# Patient Record
Sex: MALE | Race: WHITE
[De-identification: names, ages, dates, MRNs, and addresses within clinical notes are randomized per-mention and may not be internally consistent; named-entity substitution may affect disease eponyms.]

---

## 2017-01-03 ENCOUNTER — APPOINTMENT (OUTPATIENT)
Dept: ORTHOPEDIC SURGERY | Facility: CLINIC | Age: 67
End: 2017-01-03

## 2017-01-03 VITALS — BODY MASS INDEX: 26.31 KG/M2 | WEIGHT: 205 LBS | HEIGHT: 74 IN

## 2017-01-03 DIAGNOSIS — Z87.891 PERSONAL HISTORY OF NICOTINE DEPENDENCE: ICD-10-CM

## 2017-02-06 ENCOUNTER — APPOINTMENT (OUTPATIENT)
Dept: ORTHOPEDIC SURGERY | Facility: CLINIC | Age: 67
End: 2017-02-06

## 2018-06-12 ENCOUNTER — RX RENEWAL (OUTPATIENT)
Age: 68
End: 2018-06-12

## 2018-12-18 ENCOUNTER — APPOINTMENT (OUTPATIENT)
Dept: CARDIOLOGY | Facility: CLINIC | Age: 68
End: 2018-12-18
Payer: MEDICARE

## 2018-12-18 PROCEDURE — 93306 TTE W/DOPPLER COMPLETE: CPT

## 2018-12-19 ENCOUNTER — RX RENEWAL (OUTPATIENT)
Age: 68
End: 2018-12-19

## 2019-01-08 ENCOUNTER — APPOINTMENT (OUTPATIENT)
Dept: CARDIOLOGY | Facility: CLINIC | Age: 69
End: 2019-01-08
Payer: MEDICARE

## 2019-01-08 ENCOUNTER — RX RENEWAL (OUTPATIENT)
Age: 69
End: 2019-01-08

## 2019-01-08 VITALS
HEART RATE: 57 BPM | DIASTOLIC BLOOD PRESSURE: 88 MMHG | WEIGHT: 202 LBS | OXYGEN SATURATION: 97 % | SYSTOLIC BLOOD PRESSURE: 142 MMHG | BODY MASS INDEX: 25.94 KG/M2

## 2019-01-08 DIAGNOSIS — Z82.49 FAMILY HISTORY OF ISCHEMIC HEART DISEASE AND OTHER DISEASES OF THE CIRCULATORY SYSTEM: ICD-10-CM

## 2019-01-08 DIAGNOSIS — Z80.9 FAMILY HISTORY OF MALIGNANT NEOPLASM, UNSPECIFIED: ICD-10-CM

## 2019-01-08 PROCEDURE — 93000 ELECTROCARDIOGRAM COMPLETE: CPT

## 2019-01-08 PROCEDURE — 99215 OFFICE O/P EST HI 40 MIN: CPT

## 2019-01-08 RX ORDER — ASPIRIN 325 MG/1
TABLET, FILM COATED ORAL
Refills: 0 | Status: ACTIVE | COMMUNITY

## 2019-01-13 ENCOUNTER — NON-APPOINTMENT (OUTPATIENT)
Age: 69
End: 2019-01-13

## 2019-01-13 NOTE — REVIEW OF SYSTEMS
[Feeling Fatigued] : feeling fatigued [Eyeglasses] : currently wearing eyeglasses [see HPI] : see HPI [Palpitations] : palpitations [Joint Pain] : joint pain [Negative] : Heme/Lymph

## 2019-01-14 NOTE — HISTORY OF PRESENT ILLNESS
[FreeTextEntry1] : 68-year-old man\par Routine followup\par Edmund reports experiencing fleeting palpitations lasting for only seconds. The symptoms are not progressive or severe. No sustained palpitations. No angina. No shortness of breath. No orthopnea. No syncope.

## 2019-01-14 NOTE — DISCUSSION/SUMMARY
[FreeTextEntry1] : Atrial fibrillation\par Mr. Weber has known paroxysmal atrial fibrillation/supraventricular tachycardia since age 40. Atrial arrhythmias including atrial fibrillation with rapid ventricular rates have persisted despite prior ablation procedures. In 9/02 he underwent radiofrequency ablation at [facility]. Previous trials of antiarrhythmic agents including sotalol and flecainide were either not tolerated or ineffective. In 9/13 atrial fibrillationwith ventricular rate 140/minute lead to increasing doses of carvedilol.  A 10/13 Holter monitor revealed frequent atrial ectopy and nonsustained supraventricular tachycardia. A relatively low "[id number]" score is thought to indicate the risk of anticoagulation outweighs any potential benefit at this time.\par \par I have recommended the following:\par 1. Continue the present medical regimen\par 2. No further cardiac testing for this problem at this time\par 3. Increase carvedilol dose (with monitoring for bradycardia) if increasing symptoms of paroxysmal atrial fibrillation occur\par 4. Event recorder or implantable cardiac loop recorder if increasing symptoms without electrocardiographic documentation. 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 Left ventricular systolic dysfunction was reflected by a left ventricular ejection fraction of 45% on 1/13 echocardiogram with left ventricle dilatation manifested by end-diastolic dimension of 6.0 cm. A 2/13 right and left heart catheterization revealed mild diffuse biventricular hypokinesis and a left ventricular ejection fraction of 50%. The left ventricle end-diastolic and pulmonary artery systolic pressures were normal. The coronary arteries were normal. There is been a favorable response to medical intervention. The 12/18 echocardiogram demonstrated a normal left ventricular end-diastolic dimension of 5.5 cm and a left ventricular ejection fraction of 66%. The present cardiac physical examination is consistent with a euvolemic state New [place] Heart Association class I .The dose of both carvedilol and ramipril may be increased if required.\par \par I have recommended the following:\par 1 continue present medical regimen\par 2. Routine laboratory studies including electrolytes, lipid levels and renal function through primary care\par 3 no further cardiac testing for this problem at this time.\par \par \par \par \par \par Hypertension\par Hypertension has been controlled on the present medical regimen. In the setting of left ventricular systolic dysfunction beta blocker and ACE-I therapy are attractive. The doses of both ramipril and carvedilol may be increased if required to maintain optimum levels. Nonpharmacological therapy, specifically diet and exercise are emphasized as major aspects of treatment.\par \par I have recommended the following:\par 1 low-salt low-fat low-cholesterol diet. Regular aerobic exercise. Weight loss\par 2 continue present medical regimen\par 3 home blood pressure monitoring\par 4 increase carvedilol (with monitoring for bradycardia)  and./or ramipril doses if required to maintain optimum levels as discussed above.

## 2019-01-14 NOTE — PHYSICAL EXAM
[Normal Conjunctiva] : the conjunctiva exhibited no abnormalities [Auscultation Breath Sounds / Voice Sounds] : lungs were clear to auscultation bilaterally [Heart Rate And Rhythm] : heart rate and rhythm were normal [Heart Sounds] : normal S1 and S2 [Murmurs] : no murmurs present [Arterial Pulses Normal] : the arterial pulses were normal [Edema] : no peripheral edema present [Bowel Sounds] : normal bowel sounds [Abdomen Soft] : soft [Abnormal Walk] : normal gait [] : no rash [Affect] : the affect was normal [FreeTextEntry1] : pleasant

## 2019-02-04 ENCOUNTER — RX RENEWAL (OUTPATIENT)
Age: 69
End: 2019-02-04

## 2019-03-12 ENCOUNTER — RECORD ABSTRACTING (OUTPATIENT)
Age: 69
End: 2019-03-12

## 2019-03-12 DIAGNOSIS — M85.652: ICD-10-CM

## 2019-03-25 ENCOUNTER — APPOINTMENT (OUTPATIENT)
Dept: ORTHOPEDIC SURGERY | Facility: CLINIC | Age: 69
End: 2019-03-25
Payer: MEDICARE

## 2019-03-25 PROCEDURE — 99214 OFFICE O/P EST MOD 30 MIN: CPT | Mod: 25

## 2019-03-25 PROCEDURE — 20605 DRAIN/INJ JOINT/BURSA W/O US: CPT | Mod: LT

## 2019-03-25 PROCEDURE — 73070 X-RAY EXAM OF ELBOW: CPT | Mod: LT

## 2019-04-02 ENCOUNTER — APPOINTMENT (OUTPATIENT)
Dept: FAMILY MEDICINE | Facility: CLINIC | Age: 69
End: 2019-04-02
Payer: MEDICARE

## 2019-04-02 VITALS — SYSTOLIC BLOOD PRESSURE: 132 MMHG | DIASTOLIC BLOOD PRESSURE: 80 MMHG

## 2019-04-02 VITALS — HEART RATE: 60 BPM

## 2019-04-02 VITALS
WEIGHT: 201 LBS | DIASTOLIC BLOOD PRESSURE: 70 MMHG | HEIGHT: 74 IN | BODY MASS INDEX: 25.8 KG/M2 | SYSTOLIC BLOOD PRESSURE: 120 MMHG

## 2019-04-02 DIAGNOSIS — Z00.00 ENCOUNTER FOR GENERAL ADULT MEDICAL EXAMINATION W/OUT ABNORMAL FINDINGS: ICD-10-CM

## 2019-04-02 PROCEDURE — 99214 OFFICE O/P EST MOD 30 MIN: CPT | Mod: 25

## 2019-04-02 PROCEDURE — G0439: CPT

## 2019-04-02 PROCEDURE — 36415 COLL VENOUS BLD VENIPUNCTURE: CPT

## 2019-04-02 RX ORDER — RAMIPRIL 5 MG/1
5 CAPSULE ORAL DAILY
Qty: 90 | Refills: 3 | Status: DISCONTINUED | COMMUNITY
Start: 2019-02-04 | End: 2019-04-02

## 2019-04-02 NOTE — HISTORY OF PRESENT ILLNESS
[FreeTextEntry1] : "I amd doing ok in the big picture" [de-identified] : \par The patient is fasting.  \par There have been no interim hospitalizations, ER visits, or significant injuries or illnesses.\par

## 2019-04-02 NOTE — HEALTH RISK ASSESSMENT
[0-5] : 0-5 [Patient reported colonoscopy was normal] : Patient reported colonoscopy was normal [HIV test declined] : HIV test declined [Hepatitis C test offered] : Hepatitis C test offered [Good] : ~his/her~ current health as good [Fair] :  ~his/her~ mood as fair [No falls in past year] : Patient reported no falls in the past year [0] : 2) Feeling down, depressed, or hopeless: Not at all (0) [None] : None [With Significant Other] : lives with significant other [# of Members in Household ___] :  household currently consist of [unfilled] member(s) [Retired] : retired [] :  [# Of Children ___] : has [unfilled] children [Feels Safe at Home] : Feels safe at home [Fully functional (bathing, dressing, toileting, transferring, walking, feeding)] : Fully functional (bathing, dressing, toileting, transferring, walking, feeding) [Fully functional (using the telephone, shopping, preparing meals, housekeeping, doing laundry, using] : Fully functional and needs no help or supervision to perform IADLs (using the telephone, shopping, preparing meals, housekeeping, doing laundry, using transportation, managing medications and managing finances) [Reports normal functional visual acuity (ie: able to read med bottle)] : Reports normal functional visual acuity [] : No [de-identified] : Dr Mcnulty [YearQuit] : 1993 [de-identified] : Active lifestyle. 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

## 2019-04-03 LAB
ALBUMIN SERPL ELPH-MCNC: 4.6 G/DL
ALP BLD-CCNC: 62 U/L
ALT SERPL-CCNC: 12 U/L
ANION GAP SERPL CALC-SCNC: 12 MMOL/L
AST SERPL-CCNC: 14 U/L
BASOPHILS # BLD AUTO: 0.04 K/UL
BASOPHILS NFR BLD AUTO: 0.5 %
BILIRUB SERPL-MCNC: 0.3 MG/DL
BUN SERPL-MCNC: 21 MG/DL
CALCIUM SERPL-MCNC: 9.2 MG/DL
CHLORIDE SERPL-SCNC: 106 MMOL/L
CHOLEST SERPL-MCNC: 176 MG/DL
CHOLEST/HDLC SERPL: 2.8 RATIO
CO2 SERPL-SCNC: 26 MMOL/L
CREAT SERPL-MCNC: 0.83 MG/DL
EOSINOPHIL # BLD AUTO: 0.09 K/UL
EOSINOPHIL NFR BLD AUTO: 1.1 %
GLUCOSE SERPL-MCNC: 102 MG/DL
HCT VFR BLD CALC: 44.3 %
HDLC SERPL-MCNC: 64 MG/DL
HGB BLD-MCNC: 14 G/DL
IMM GRANULOCYTES NFR BLD AUTO: 0.1 %
LDLC SERPL CALC-MCNC: 88 MG/DL
LYMPHOCYTES # BLD AUTO: 1.63 K/UL
LYMPHOCYTES NFR BLD AUTO: 20.7 %
MAN DIFF?: NORMAL
MCHC RBC-ENTMCNC: 30.6 PG
MCHC RBC-ENTMCNC: 31.6 GM/DL
MCV RBC AUTO: 96.9 FL
MONOCYTES # BLD AUTO: 0.57 K/UL
MONOCYTES NFR BLD AUTO: 7.2 %
NEUTROPHILS # BLD AUTO: 5.53 K/UL
NEUTROPHILS NFR BLD AUTO: 70.4 %
PLATELET # BLD AUTO: 244 K/UL
POTASSIUM SERPL-SCNC: 4.5 MMOL/L
PROT SERPL-MCNC: 7.3 G/DL
RBC # BLD: 4.57 M/UL
RBC # FLD: 14 %
SODIUM SERPL-SCNC: 144 MMOL/L
TRIGL SERPL-MCNC: 120 MG/DL
WBC # FLD AUTO: 7.87 K/UL

## 2019-04-30 NOTE — HISTORY OF PRESENT ILLNESS
[de-identified] : Patient reports moderate to severe lateral left elbow pain for 3.5 months.  Patient was lifting a parcel when he felt the pain.  Pain with elbow rotation (turning key), Some pain with wrist motion down forearm .  Pain is worst when waking up in the morning.  No weakness, numbness, tingling or burning.  No NSAID use or treatment for this problem.

## 2019-04-30 NOTE — CONSULT LETTER
[Dear  ___] : Dear  [unfilled], [Consult Letter:] : I had the pleasure of evaluating your patient, [unfilled]. [Please see my note below.] : Please see my note below. [Sincerely,] : Sincerely, [FreeTextEntry2] : Dr. Anton Estrada\par [address] [facility]\[place], [place], [address]\par Phone: [phone]\par Fax: [phone]\par  [FreeTextEntry1] : Thank you for referring this patient to my office. [FreeTextEntry3] : Gil Mcnulty MD\par

## 2019-04-30 NOTE — PHYSICAL EXAM
[Normal] : Gait: normal [UE] : Sensory: Intact in bilateral upper extremities [Bicep] : biceps 2+ and symmetric bilaterally [Rad] : radial 2+ and symmetric bilaterally [Elbow Instability Laxity Left Medial] : negative Valgus Stress test [Elbow Instability Laxity Left Lateral] : negative Varus stress test [FreeTextEntry2] : Pain on palpation\par right no\par left later\par ROM\par right full\par left full\par Strength\par right 5/5\par left 5/5\par skin intact

## 2019-07-09 ENCOUNTER — APPOINTMENT (OUTPATIENT)
Dept: CARDIOLOGY | Facility: CLINIC | Age: 69
End: 2019-07-09
Payer: MEDICARE

## 2019-07-09 VITALS
OXYGEN SATURATION: 97 % | BODY MASS INDEX: 26.83 KG/M2 | WEIGHT: 209 LBS | DIASTOLIC BLOOD PRESSURE: 80 MMHG | HEART RATE: 51 BPM | SYSTOLIC BLOOD PRESSURE: 132 MMHG

## 2019-07-09 DIAGNOSIS — Z86.79 PERSONAL HISTORY OF OTHER DISEASES OF THE CIRCULATORY SYSTEM: ICD-10-CM

## 2019-07-09 PROCEDURE — 99214 OFFICE O/P EST MOD 30 MIN: CPT

## 2019-07-09 PROCEDURE — 93000 ELECTROCARDIOGRAM COMPLETE: CPT

## 2019-07-12 ENCOUNTER — NON-APPOINTMENT (OUTPATIENT)
Age: 69
End: 2019-07-12

## 2019-07-12 PROBLEM — Z86.79 HISTORY OF HYPERTENSION: Status: RESOLVED | Noted: 2019-07-12 | Resolved: 2019-07-12

## 2019-07-12 NOTE — PHYSICAL EXAM
[Normal Conjunctiva] : the conjunctiva exhibited no abnormalities [Heart Rate And Rhythm] : heart rate and rhythm were normal [Auscultation Breath Sounds / Voice Sounds] : lungs were clear to auscultation bilaterally [Heart Sounds] : normal S1 and S2 [Murmurs] : no murmurs present [Arterial Pulses Normal] : the arterial pulses were normal [Edema] : no peripheral edema present [Bowel Sounds] : normal bowel sounds [Abdomen Soft] : soft [Abnormal Walk] : normal gait [] : no rash [Affect] : the affect was normal [FreeTextEntry1] : pleasant

## 2019-07-12 NOTE — HISTORY OF PRESENT ILLNESS
[FreeTextEntry1] : 68-year-old man\par Routine followup\par "I feel okay "Edmund states that only rarely does he feel extrasystoles. No sustained palpitations. No chest pain. No shortness of breath. No syncope.

## 2019-07-12 NOTE — DISCUSSION/SUMMARY
[FreeTextEntry1] : Atrial fibrillation\par Mr. Weber has known paroxysmal atrial fibrillation/supraventricular tachycardia since age 40. Atrial arrhythmias including atrial fibrillation with rapid ventricular rates have persisted despite prior ablation procedures In 1/2000 he underwent an electrophysiological study and attempt at ablation through Dr. Zhang/[facility] [place]. . In 9/02 he underwent radiofrequency ablation at [email or web address] The details and reports of the ablation and electrophysiological  studies have not been available. Previous trials of antiarrhythmic agents including sotalol and flecainide were either not tolerated or ineffective. In 9/13 atrial fibrillation with ventricular rate 140/minute lead to increasing doses of carvedilol.  A 10/13 Holter monitor revealed frequent atrial ectopy and nonsustained supraventricular tachycardia. A relatively low "[id number]" score is thought to indicate the risk of anticoagulation outweighs any potential benefit at this time.\par \par I have recommended the following:\par 1. Continue the present medical regimen\par 2. No further cardiac testing for this problem at this time\par 3. Increase carvedilol dose (with monitoring for bradycardia) if increasing symptoms of paroxysmal atrial fibrillation occur\par 4. Event recorder or implantable cardiac loop recorder if increasing symptoms without electrocardiographic documentation. 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 Left ventricular systolic dysfunction was reflected by a left ventricular ejection fraction of 45% on 1/13 echocardiogram with left ventricle dilatation manifested by end-diastolic dimension of 6.0 cm. A 2/13 right and left heart catheterization revealed mild diffuse biventricular hypokinesis and a left ventricular ejection fraction of 50%. The left ventricle end-diastolic and pulmonary artery systolic pressures were normal. The coronary arteries were normal. There is been a favorable response to medical intervention. The 12/18 echocardiogram demonstrated a normal left ventricular end-diastolic dimension of 5.5 cm and a left ventricular ejection fraction of 66%. The present cardiac physical examination is consistent with a euvolemic state [place] Heart Association class I .The dose of both carvedilol and ramipril may be increased if required.\par \par I have recommended the following:\par 1 continue present medical regimen\par 2. Routine laboratory studies including electrolytes, lipid levels and renal function through primary care\par 3 no further cardiac testing for this problem at this time.\par \par \par \par \par \par Hypertension\par Hypertension has been controlled on the present medical regimen. In the setting of left ventricular systolic dysfunction beta blocker and ACE-I therapy are attractive. The doses of both ramipril and carvedilol may be increased if required to maintain optimum levels. Nonpharmacological therapy, specifically diet and exercise are emphasized as major aspects of treatment.\par \par I have recommended the following:\par 1 low-salt low-fat low-cholesterol diet. Regular aerobic exercise. Weight loss\par 2 continue present medical regimen\par 3 home blood pressure monitoring\par 4 increase carvedilol (with monitoring for bradycardia)  and./or ramipril doses if required to maintain optimum levels as discussed above.

## 2019-07-26 ENCOUNTER — RX RENEWAL (OUTPATIENT)
Age: 69
End: 2019-07-26

## 2019-10-02 ENCOUNTER — RX RENEWAL (OUTPATIENT)
Age: 69
End: 2019-10-02

## 2020-01-08 ENCOUNTER — APPOINTMENT (OUTPATIENT)
Dept: CARDIOLOGY | Facility: CLINIC | Age: 70
End: 2020-01-08
Payer: MEDICARE

## 2020-01-08 VITALS
HEART RATE: 55 BPM | DIASTOLIC BLOOD PRESSURE: 80 MMHG | WEIGHT: 201 LBS | SYSTOLIC BLOOD PRESSURE: 140 MMHG | OXYGEN SATURATION: 99 % | BODY MASS INDEX: 25.81 KG/M2

## 2020-01-08 DIAGNOSIS — J30.2 OTHER SEASONAL ALLERGIC RHINITIS: ICD-10-CM

## 2020-01-08 DIAGNOSIS — Z86.69 PERSONAL HISTORY OF OTHER DISEASES OF THE NERVOUS SYSTEM AND SENSE ORGANS: ICD-10-CM

## 2020-01-08 PROCEDURE — 99215 OFFICE O/P EST HI 40 MIN: CPT

## 2020-01-10 PROBLEM — Z86.69 HISTORY OF TINNITUS: Status: RESOLVED | Noted: 2020-01-10 | Resolved: 2020-01-10

## 2020-01-10 PROBLEM — J30.2 SEASONAL ALLERGIES: Status: RESOLVED | Noted: 2020-01-10 | Resolved: 2020-01-10

## 2020-01-10 NOTE — DISCUSSION/SUMMARY
[FreeTextEntry1] : Atrial fibrillation\par Mr. Weber has known paroxysmal atrial fibrillation/supraventricular tachycardia since age 40. Atrial arrhythmias including atrial fibrillation with rapid ventricular rates have persisted despite prior ablation procedures In 1/2000 he underwent an electrophysiological study and attempt at ablation through Dr. Zhang/[facility] [place]. . In 9/02 he underwent radiofrequency ablation at [facility]. The details and reports of the ablation and electrophysiological  studies have not been available. Previous trials of antiarrhythmic agents including sotalol and flecainide were either not tolerated or ineffective. In 9/13 atrial fibrillation with ventricular rate 140/minute lead to increasing doses of carvedilol.  A 10/13 Holter monitor revealed frequent atrial ectopy and nonsustained supraventricular tachycardia. A relatively low "[id number]" score is thought to indicate the risk of anticoagulation outweighs any potential benefit at this time.\par \par I have recommended the following:\par 1. Continue the present medical regimen\par 2. No further cardiac testing for this problem at this time\par 3. Increase carvedilol dose (with monitoring for bradycardia) if increasing symptoms of paroxysmal atrial fibrillation occur\par 4. Event recorder or implantable cardiac loop recorder if increasing symptoms without electrocardiographic documentation. 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 Left ventricular systolic dysfunction was reflected by a left ventricular ejection fraction of 45% on 1/13 echocardiogram with left ventricle dilatation manifested by end-diastolic dimension of 6.0 cm. A 2/13 right and left heart catheterization revealed mild diffuse biventricular hypokinesis and a left ventricular ejection fraction of 50%. The left ventricle end-diastolic and pulmonary artery systolic pressures were normal. The coronary arteries were normal. There is been a favorable response to medical intervention. The 12/18 echocardiogram demonstrated a normal left ventricular end-diastolic dimension of 5.5 cm and a left ventricular ejection fraction of 66%. The present cardiac physical examination is consistent with a euvolemic state [place] Heart Association class I .The dose of both carvedilol and ramipril may be increased if required.\par \par I have recommended the following:\par 1 continue present medical regimen\par 2. Routine laboratory studies including electrolytes, lipid levels and renal function through primary care\par 3 no further cardiac testing for this problem at this time.\par 4 Anticipate echocardiogram 2020\par \par \par \par \par \par Hypertension\par Hypertension has been controlled on the present medical regimen. In the setting of left ventricular systolic dysfunction beta blocker and ACE-I therapy are attractive. The doses of  carvedilol may be increased if required to maintain optimum levels. Nonpharmacological therapy, specifically diet and exercise are emphasized as major aspects of treatment.\par \par I have recommended the following:\par 1 low-salt low-fat low-cholesterol diet. Regular aerobic exercise. Weight loss\par 2 continue present medical regimen\par 3 home blood pressure monitoring\par 4 increase carvedilol (with monitoring for bradycardia)   dose if required to maintain optimum levels as discussed above.\par \par \par \par The diagnosis, prognosis, risks options and alternatives were explained at length to the patient. All questions were answered. Issues discussed included atrial fibrillation hypertension and left ventricular systolic dysfunction/cardiomyopathy. Counseling and coordination of care: Time was a significant factor for this patient encounter. Time spent with the patient was 40 minutes. 50% of the time was devoted to counseling and coordination of care.

## 2020-01-10 NOTE — HISTORY OF PRESENT ILLNESS
[FreeTextEntry1] : 69 year-old man\par Routine followup\par "I feel okay. "Occasional extrasystoles but no sustained palpitations. No chest pain. No shortness of breath. No syncope. Home blood pressure levels vary depending on diet and level of exercise. In general blood pressure levels have been satisfactory.

## 2020-01-10 NOTE — REVIEW OF SYSTEMS
[see HPI] : see HPI [Feeling Fatigued] : feeling fatigued [Eyeglasses] : currently wearing eyeglasses [Joint Pain] : joint pain [Palpitations] : palpitations [Negative] : Endocrine

## 2020-01-10 NOTE — PHYSICAL EXAM
[Normal Conjunctiva] : the conjunctiva exhibited no abnormalities [Auscultation Breath Sounds / Voice Sounds] : lungs were clear to auscultation bilaterally [Heart Rate And Rhythm] : heart rate and rhythm were normal [Heart Sounds] : normal S1 and S2 [Arterial Pulses Normal] : the arterial pulses were normal [Edema] : no peripheral edema present [Murmurs] : no murmurs present [Bowel Sounds] : normal bowel sounds [Abdomen Soft] : soft [Abnormal Walk] : normal gait [] : no rash [Affect] : the affect was normal [FreeTextEntry1] : pleasant

## 2020-07-08 ENCOUNTER — APPOINTMENT (OUTPATIENT)
Dept: CARDIOLOGY | Facility: CLINIC | Age: 70
End: 2020-07-08

## 2021-07-21 ENCOUNTER — NON-APPOINTMENT (OUTPATIENT)
Age: 71
End: 2021-07-21

## 2021-07-21 ENCOUNTER — APPOINTMENT (OUTPATIENT)
Dept: CARDIOLOGY | Facility: CLINIC | Age: 71
End: 2021-07-21
Payer: MEDICARE

## 2021-07-21 VITALS
SYSTOLIC BLOOD PRESSURE: 180 MMHG | WEIGHT: 205 LBS | BODY MASS INDEX: 26.32 KG/M2 | HEART RATE: 54 BPM | OXYGEN SATURATION: 98 % | DIASTOLIC BLOOD PRESSURE: 90 MMHG

## 2021-07-21 DIAGNOSIS — Z87.898 PERSONAL HISTORY OF OTHER SPECIFIED CONDITIONS: ICD-10-CM

## 2021-07-21 PROCEDURE — 93000 ELECTROCARDIOGRAM COMPLETE: CPT

## 2021-07-21 PROCEDURE — 99214 OFFICE O/P EST MOD 30 MIN: CPT

## 2021-07-22 PROBLEM — Z87.898 FORMER CONSUMPTION OF ALCOHOL: Status: ACTIVE | Noted: 2021-07-22

## 2021-07-24 NOTE — REVIEW OF SYSTEMS
[Feeling Fatigued] : feeling fatigued [Joint Pain] : joint pain [Negative] : Heme/Lymph [FreeTextEntry3] : glasses [FreeTextEntry5] : see history of present illness

## 2021-07-24 NOTE — DISCUSSION/SUMMARY
[FreeTextEntry1] : Atrial fibrillation\par Mr. Weber has known paroxysmal atrial fibrillation/supraventricular tachycardia since age 40. Atrial arrhythmias including atrial fibrillation with rapid ventricular rates have persisted despite prior ablation procedures In 1/2000 he underwent an electrophysiological study and attempt at ablation through Dr. Zhang/[facility] [place]. . In 9/02 he underwent radiofrequency ablation at [facility]. The details and reports of the ablation and electrophysiological  studies have not been available. Previous trials of antiarrhythmic agents including sotalol and flecainide were either not tolerated or ineffective. In 9/13 atrial fibrillation with ventricular rate 140/minute lead to increasing doses of carvedilol.  A 10/13 Holter monitor revealed frequent atrial ectopy and nonsustained supraventricular tachycardia. A relatively low "[id number]" score is thought to indicate the risk of anticoagulation outweighs any potential benefit at this time.\par \par I have recommended the following:\par 1. Continue the present medical regimen\par 2. No further cardiac testing for this problem at this time\par 3. Increase carvedilol dose (with monitoring for bradycardia) if increasing symptoms of paroxysmal atrial fibrillation occur\par 4. Event recorder or implantable cardiac loop recorder if increasing symptoms without electrocardiographic documentation. 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 Left ventricular systolic dysfunction was reflected by a left ventricular ejection fraction of 45% on 1/13 echocardiogram with left ventricle dilatation manifested by end-diastolic dimension of 6.0 cm. A 2/13 right and left heart catheterization revealed mild diffuse biventricular hypokinesis and a left ventricular ejection fraction of 50%. The left ventricle end-diastolic and pulmonary artery systolic pressures were normal. The coronary arteries were normal. There is been a favorable response to medical intervention. The 12/18 echocardiogram demonstrated a normal left ventricular end-diastolic dimension of 5.5 cm and a left ventricular ejection fraction of 66%. The present cardiac physical examination is consistent with a euvolemic state [place] Heart Association class I .The dose of both carvedilol and ramipril may be increased if required.\par \par I have recommended the following:\par 1 continue present medical regimen\par 2. Routine laboratory studies including electrolytes, lipid levels and renal function through primary care\par 3 no further cardiac testing for this problem at this time.\par 4 Echocardiogram with next office  evaluation in 6 months\par \par \par \par \par \par Hypertension\par Hypertension has been controlled on the present medical regimen. In the setting of left ventricular systolic dysfunction beta blocker and ACE-I therapy are attractive. The doses of both ramipril and carvedilol may be increased if required to maintain optimum levels. Nonpharmacological therapy, specifically diet and exercise are emphasized as major aspects of treatment.\par \par I have recommended the following:\par 1 low-salt low-fat low-cholesterol diet. Regular aerobic exercise. Weight loss\par 2 continue present medical regimen\par 3 home blood pressure monitoring\par 4 increase carvedilol (with monitoring for bradycardia)  and./or ramipril dose  if required to maintain optimum levels as discussed above.\par \par \par The diagnosis, prognosis, risks, options and alternatives were explained at length to the patient. All questions were answered. Issues discussed included hypertension antihypertensive medical therapy cardiomyopathy/left ventricular systolic dysfunction noninvasive cardiac testing diet and exercise.\par \par Counseling and/or  coordination of care\par Time was a significant factor for this patient encounter. Total time spent with patient was 30 minutes. Greater than 50% of the time was devoted to counseling and/orl coordination of care

## 2021-07-24 NOTE — HISTORY OF PRESENT ILLNESS
[FreeTextEntry1] : 70-year-old man\par Routine followup\par "I feel okay" Edmund denies chest pain, sustained palpitations shortness of breath or syncope. He is physically active without restriction or limitation. Occasional "extra beats" are unchanged from in the past and not progressive or severe. His main concern is that of elevated blood pressure levels after the ingestion of salt products.

## 2021-09-03 ENCOUNTER — NON-APPOINTMENT (OUTPATIENT)
Age: 71
End: 2021-09-03

## 2022-01-19 ENCOUNTER — APPOINTMENT (OUTPATIENT)
Dept: CARDIOLOGY | Facility: CLINIC | Age: 72
End: 2022-01-19

## 2022-07-27 ENCOUNTER — NON-APPOINTMENT (OUTPATIENT)
Age: 72
End: 2022-07-27

## 2022-07-27 ENCOUNTER — APPOINTMENT (OUTPATIENT)
Dept: CARDIOLOGY | Facility: CLINIC | Age: 72
End: 2022-07-27

## 2022-07-27 VITALS
SYSTOLIC BLOOD PRESSURE: 156 MMHG | BODY MASS INDEX: 26.45 KG/M2 | DIASTOLIC BLOOD PRESSURE: 92 MMHG | WEIGHT: 206 LBS | OXYGEN SATURATION: 97 % | HEART RATE: 65 BPM

## 2022-07-27 DIAGNOSIS — Z86.79 PERSONAL HISTORY OF OTHER DISEASES OF THE CIRCULATORY SYSTEM: ICD-10-CM

## 2022-07-27 DIAGNOSIS — I48.0 PAROXYSMAL ATRIAL FIBRILLATION: ICD-10-CM

## 2022-07-27 PROCEDURE — 93306 TTE W/DOPPLER COMPLETE: CPT

## 2022-07-27 PROCEDURE — 93000 ELECTROCARDIOGRAM COMPLETE: CPT | Mod: NC

## 2022-07-27 PROCEDURE — 99214 OFFICE O/P EST MOD 30 MIN: CPT

## 2022-07-29 PROBLEM — Z86.79 HISTORY OF HEART VALVE ABNORMALITY: Status: RESOLVED | Noted: 2019-01-08 | Resolved: 2022-07-29

## 2022-07-29 PROBLEM — Z86.79 HISTORY OF CARDIOMYOPATHY: Status: RESOLVED | Noted: 2019-07-12 | Resolved: 2022-07-29

## 2022-07-29 NOTE — HISTORY OF PRESENT ILLNESS
[FreeTextEntry1] : 71-year-old man\par Routine followup/ Preoperative cardiovascular examination\par "I feel good."  Edmund denies chest pain, palpitations, shortness of breath or syncope. He is physically active exercising regularly by bike riding as much as 20 miles during the day without restriction or limitation. Blood pressure levels have been variable by home monitoring  ( see attached report)\par \par Decreased visual acuity and evidence of cataract formation led to a recommendation for extraction.\par \par \par

## 2022-07-29 NOTE — DISCUSSION/SUMMARY
[FreeTextEntry1] : Preoperative cardiovascular examination\par \par Decreased visual acuity and evidence of cataract formation led to a recommendation for extraction.\par \par Comment\par There is no cardiac contraindication to ophthalmologic with surgery. There is no history of a myocardial infarction. The patient's hemodynamic is stable without evidence of heart failure .The 7/27/22 electrocardiogram shows no evidence of myocardial  ischemia or infarction. As such, the operation may proceed with an  acceptable cardiac risk.\par \par I have recommended the following\par a. Continue beta blockers ( carvedilol 6.25 mg b.i.d.) without interruption in the perioperative period\par b. No further cardiac testing is required at this time\par c. Workup and treatment as directed by ophthalmology Dr. Orantes\par d. Call if any perioperative cardiovascular issues arise.\par \par \par \par \par \par Atrial fibrillation\par Mr. Weber has known paroxysmal atrial fibrillation/supraventricular tachycardia since age 40. Atrial arrhythmias including atrial fibrillation with rapid ventricular rates have persisted despite prior ablation procedures In 1/2000 he underwent an electrophysiological study and attempt at ablation through Dr. Zhang/[facility]. . In 9/02 he underwent radiofrequency ablation at [facility]. The details and reports of the ablation and electrophysiological  studies have not been available. Previous trials of antiarrhythmic agents including sotalol and flecainide were either not tolerated or ineffective. In 9/13 atrial fibrillation with ventricular rate 140/minute lead to increasing doses of carvedilol.  A 10/13 Holter monitor revealed frequent atrial ectopy and nonsustained supraventricular tachycardia. A relatively low "[id number]" score is thought to indicate the risk of anticoagulation outweighs any potential benefit at this time.\par \par I have recommended the following:\par 1. Continue the present medical regimen\par 2. No further cardiac testing for this problem at this time\par 3. Increase carvedilol dose (with monitoring for bradycardia) if increasing symptoms of paroxysmal atrial fibrillation occur\par 4. Event recorder or implantable cardiac loop recorder if increasing symptoms without electrocardiographic documentation. 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 Left ventricular systolic dysfunction was reflected by a left ventricular ejection fraction of 45% on 1/13 echocardiogram with left ventricle dilatation manifested by end-diastolic dimension of 6.0 cm. A 2/13 right and left heart catheterization revealed mild diffuse biventricular hypokinesis and a left ventricular ejection fraction of 50%. The left ventricle end-diastolic and pulmonary artery systolic pressures were normal. The coronary arteries were normal. There is been a favorable response to medical intervention. The 7/22 echocardiogram demonstrated a  upper limit of  normal left ventricular end-diastolic dimension of 5.7 cm and a left ventricular ejection fraction of 54%. The present cardiac physical examination is consistent with a euvolemic state [place] Heart Association class I .The dose of both carvedilol and ramipril may be increased if required.\par \par I have recommended the following:\par 1 continue present medical regimen\par 2. Routine laboratory studies including electrolytes, lipid levels and renal function through primary care\par 3 no further cardiac testing for this problem at this time.\par \par \par \par \par \par \par Hypertension\par Hypertension has been controlled on the present medical regimen.  Elevation of blood pressure on initial examination today (156/92 mmHg) is attributed to a white coat effect.. In the setting of left ventricular systolic dysfunction beta blocker and ACE-I therapy are attractive. The doses of both ramipril and carvedilol may be increased if required to maintain optimum levels. Nonpharmacological therapy, specifically diet and exercise are emphasized as major aspects of treatment.\par \par I have recommended the following:\par 1 low-salt low-fat low-cholesterol diet. Regular aerobic exercise. Weight loss\par 2 continue present medical regimen\par 3 home blood pressure monitoring\par 4 increase carvedilol (with monitoring for bradycardia)  and./or ramipril dose  if required to maintain optimum levels as discussed above.\par \par \par \par Valvular heart disease\par The 7/22 echocardiogram revealed mild aortic/mitral regurgitation. The pulmonary artery systolic pressure was normal at 28 mm Hg. The left ventricle ejection fraction was 54%. The present cardiac physical examination is not suggestive of hemodynamically significant valvular pathology.\par \par I have  recommended the following\par a. No further cardiac testing for this problem at this time\par \par \par \par \par The diagnosis, prognosis, risks, options and alternatives were explained at length to the patient. All questions were answered. Issues discussed included hypertension antihypertensive medical therapy cardiomyopathy/left ventricular systolic dysfunction noninvasive cardiac testing diet and exercise.\par \par Counseling and/or  coordination of care\par Time was a significant factor for this patient encounter. Total time spent with patient was 30 minutes. Greater than 50% of the time was devoted to counseling and/or coordination of care

## 2022-07-31 ENCOUNTER — NON-APPOINTMENT (OUTPATIENT)
Age: 72
End: 2022-07-31

## 2022-08-31 RX ORDER — CARVEDILOL 6.25 MG/1
6.25 TABLET, FILM COATED ORAL
Qty: 180 | Refills: 3 | Status: ACTIVE | COMMUNITY
Start: 2021-08-23

## 2022-08-31 RX ORDER — RAMIPRIL 5 MG/1
5 CAPSULE ORAL
Qty: 180 | Refills: 3 | Status: ACTIVE | COMMUNITY
Start: 2021-08-23

## 2023-04-13 ENCOUNTER — APPOINTMENT (OUTPATIENT)
Dept: SURGERY | Facility: CLINIC | Age: 73
End: 2023-04-13
Payer: MEDICARE

## 2023-04-13 VITALS
OXYGEN SATURATION: 98 % | WEIGHT: 206 LBS | SYSTOLIC BLOOD PRESSURE: 132 MMHG | BODY MASS INDEX: 26.44 KG/M2 | HEIGHT: 74 IN | HEART RATE: 66 BPM | DIASTOLIC BLOOD PRESSURE: 91 MMHG

## 2023-04-13 PROCEDURE — 99213 OFFICE O/P EST LOW 20 MIN: CPT

## 2023-10-23 ENCOUNTER — NON-APPOINTMENT (OUTPATIENT)
Age: 73
End: 2023-10-23

## 2023-10-23 ENCOUNTER — APPOINTMENT (OUTPATIENT)
Dept: CARDIOLOGY | Facility: CLINIC | Age: 73
End: 2023-10-23
Payer: MEDICARE

## 2023-10-23 VITALS
HEART RATE: 67 BPM | HEIGHT: 74 IN | WEIGHT: 202 LBS | BODY MASS INDEX: 25.93 KG/M2 | DIASTOLIC BLOOD PRESSURE: 104 MMHG | OXYGEN SATURATION: 97 % | SYSTOLIC BLOOD PRESSURE: 180 MMHG

## 2023-10-23 DIAGNOSIS — I38 ENDOCARDITIS, VALVE UNSPECIFIED: ICD-10-CM

## 2023-10-23 DIAGNOSIS — I10 ESSENTIAL (PRIMARY) HYPERTENSION: ICD-10-CM

## 2023-10-23 DIAGNOSIS — I42.9 CARDIOMYOPATHY, UNSPECIFIED: ICD-10-CM

## 2023-10-23 DIAGNOSIS — I48.91 UNSPECIFIED ATRIAL FIBRILLATION: ICD-10-CM

## 2023-10-23 PROCEDURE — 93000 ELECTROCARDIOGRAM COMPLETE: CPT

## 2023-10-23 PROCEDURE — 99214 OFFICE O/P EST MOD 30 MIN: CPT | Mod: 25

## 2023-10-29 PROBLEM — I10 HYPERTENSION: Status: ACTIVE | Noted: 2019-01-13

## 2023-10-29 PROBLEM — I38 VALVULAR HEART DISEASE: Status: ACTIVE | Noted: 2022-07-29

## 2023-10-29 PROBLEM — I42.9 CARDIOMYOPATHY: Status: ACTIVE | Noted: 2018-06-12

## 2023-10-29 PROBLEM — I48.91 AFIB: Status: ACTIVE | Noted: 2021-09-03

## 2023-10-31 RX ORDER — RAMIPRIL 5 MG/1
5 CAPSULE ORAL
Qty: 180 | Refills: 3 | Status: ACTIVE | COMMUNITY
Start: 2023-10-31 | End: 1900-01-01

## 2023-10-31 RX ORDER — CARVEDILOL 6.25 MG/1
6.25 TABLET, FILM COATED ORAL
Qty: 180 | Refills: 3 | Status: ACTIVE | COMMUNITY
Start: 2023-10-31 | End: 1900-01-01

## 2024-06-05 ENCOUNTER — NON-APPOINTMENT (OUTPATIENT)
Age: 74
End: 2024-06-05

## 2024-06-06 ENCOUNTER — APPOINTMENT (OUTPATIENT)
Dept: SURGERY | Facility: CLINIC | Age: 74
End: 2024-06-06
Payer: MEDICARE

## 2024-06-06 VITALS
TEMPERATURE: 98.8 F | SYSTOLIC BLOOD PRESSURE: 157 MMHG | HEART RATE: 57 BPM | DIASTOLIC BLOOD PRESSURE: 102 MMHG | OXYGEN SATURATION: 99 %

## 2024-06-06 DIAGNOSIS — Z85.820 PERSONAL HISTORY OF MALIGNANT MELANOMA OF SKIN: ICD-10-CM

## 2024-06-06 PROCEDURE — 99213 OFFICE O/P EST LOW 20 MIN: CPT

## 2024-06-06 NOTE — ASSESSMENT
[FreeTextEntry1] : Patient now 10 years since wide excision of his malignant melanoma of his left upper back.  He is here for follow-up.  He is seen annually by Dr. Luna of the Derm service and has developed no new findings.  On examination of the scar and at the level of the right upper back remains clean and intact with no changes.  There is no regional lymphadenopathy appreciated at the level of the right axilla, or the right neck.  Impression no evidence of disease, now 10 years status post wide excision of his malignant melanoma (Breslow thickness 0.35 mm).  Plan patient to return in 1 years time.  Encouraged to continue with his routine Derm follow-up as well.  Patient amenable to the plan.  I spent 20 minutes with this patient encounter.

## 2024-08-13 ENCOUNTER — NON-APPOINTMENT (OUTPATIENT)
Age: 74
End: 2024-08-13

## 2024-08-13 ENCOUNTER — APPOINTMENT (OUTPATIENT)
Dept: CARDIOLOGY | Facility: CLINIC | Age: 74
End: 2024-08-13
Payer: MEDICARE

## 2024-08-13 VITALS
HEIGHT: 74 IN | SYSTOLIC BLOOD PRESSURE: 147 MMHG | WEIGHT: 211 LBS | OXYGEN SATURATION: 100 % | DIASTOLIC BLOOD PRESSURE: 92 MMHG | HEART RATE: 63 BPM | BODY MASS INDEX: 27.08 KG/M2

## 2024-08-13 DIAGNOSIS — I48.91 UNSPECIFIED ATRIAL FIBRILLATION: ICD-10-CM

## 2024-08-13 DIAGNOSIS — E78.5 HYPERLIPIDEMIA, UNSPECIFIED: ICD-10-CM

## 2024-08-13 DIAGNOSIS — Z86.69 PERSONAL HISTORY OF OTHER DISEASES OF THE NERVOUS SYSTEM AND SENSE ORGANS: ICD-10-CM

## 2024-08-13 DIAGNOSIS — R73.03 PREDIABETES.: ICD-10-CM

## 2024-08-13 DIAGNOSIS — E66.3 OVERWEIGHT: ICD-10-CM

## 2024-08-13 DIAGNOSIS — I10 ESSENTIAL (PRIMARY) HYPERTENSION: ICD-10-CM

## 2024-08-13 DIAGNOSIS — R07.9 CHEST PAIN, UNSPECIFIED: ICD-10-CM

## 2024-08-13 DIAGNOSIS — I38 ENDOCARDITIS, VALVE UNSPECIFIED: ICD-10-CM

## 2024-08-13 DIAGNOSIS — I42.9 CARDIOMYOPATHY, UNSPECIFIED: ICD-10-CM

## 2024-08-13 PROCEDURE — 99214 OFFICE O/P EST MOD 30 MIN: CPT

## 2024-08-13 PROCEDURE — 93000 ELECTROCARDIOGRAM COMPLETE: CPT

## 2024-08-17 PROBLEM — Z86.69 HISTORY OF CATARACT: Status: RESOLVED | Noted: 2024-08-13 | Resolved: 2024-08-17

## 2024-08-17 PROBLEM — E66.3 OVERWEIGHT: Status: ACTIVE | Noted: 2024-08-17

## 2024-08-17 NOTE — DISCUSSION/SUMMARY
[FreeTextEntry1] : Chest pain The working diagnosis is noncardiac musculoskeletal chest pain.  The history is compelling for this diagnosis.  A   coronary arteriogram was normal.  The resting  electrocardiogram is normal showing no evidence of myocardial ischemia or infarction.  I have recommended the following: Reassurance No further cardiac testing for this problem at this time Anticipate ischemic reevaluation  to include CT coronary calcium score and/or exercise stress test.   Hyperlipidemia Hyperlipidemia represents a risk factor for atherosclerotic heart disease.  The target LDL level for primary prevention is about 100.  In 10/23 the serum LDL level was 121 HDL 54 triglycerides 114 and cholesterol 195.  The main clinical decision involves whether or not to institute antihyperlipidemic medical therapy.  Nonpharmacological therapy, specifically diet exercise and weight loss are emphasized as major aspects of treatment.  I have recommended the following: Low-salt low-fat low-cholesterol heart healthy diet.  Regular aerobic exercise and weight loss Target LDL level to about 100 as discussed above Nutrition dietary consultation   Atrial fibrillation Mr. Weber has known paroxysmal atrial fibrillation/supraventricular tachycardia since age 40. Atrial arrhythmias including atrial fibrillation with rapid ventricular rates have persisted despite prior ablation procedures In 2000 he underwent an electrophysiological study and attempt at ablation through Dr. Zhang/[facility]. . In  he underwent radiofrequency ablation at [facility]. The details and reports of the ablation and electrophysiological  studies have not been available. Previous trials of antiarrhythmic agents including sotalol and flecainide were either not tolerated or ineffective. In  atrial fibrillation with ventricular rate 140/minute lead to increasing doses of carvedilol.  A 10/13 Holter monitor revealed frequent atrial ectopy and nonsustained supraventricular tachycardia. A relatively low "[id number]" score is thought to indicate the risk of anticoagulation outweighs any potential benefit at this time.  I have recommended the followin. Continue the present medical regimen 2. No further cardiac testing for this problem at this time 3. Increase carvedilol dose (with monitoring for bradycardia) if increasing symptoms of paroxysmal atrial fibrillation occur 4. Event recorder or implantable cardiac loop recorder if increasing symptoms without electrocardiographic documentation.  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 Left ventricular systolic dysfunction was reflected by a left ventricular ejection fraction of 45% on  echocardiogram with left ventricle dilatation manifested by end-diastolic dimension of 6.0 cm. A  right and left heart catheterization revealed mild diffuse biventricular hypokinesis and a left ventricular ejection fraction of 50%. The left ventricle end-diastolic and pulmonary artery systolic pressures were normal. The coronary arteries were normal. There is been a favorable response to medical intervention. The  echocardiogram demonstrated a  upper limit of  normal left ventricular end-diastolic dimension of 5.7 cm and a left ventricular ejection fraction of 54%. The present cardiac physical examination is consistent with a euvolemic state [place] Heart Association class I .The dose of both carvedilol and ramipril may be increased if required.  I have recommended the followin continue present medical regimen 2. Routine laboratory studies including electrolytes, lipid levels and renal function through primary care 3 no further cardiac testing for this problem at this time. 4. Echocardiogram with next office evaluation      Hypertension Hypertension has been controlled on the present medical regimen. (See home blood pressure monitoring report)  Elevation of blood pressure on initial examination today (147/92 mmHg) is attributed to a white coat effect.. In the setting of left ventricular systolic dysfunction beta blocker and ACE-I therapy are attractive. The doses of both ramipril and carvedilol may be increased if required to maintain optimum levels. Nonpharmacological therapy, specifically diet and exercise are emphasized as major aspects of treatment.  I have recommended the followin low-salt low-fat low-cholesterol diet. Regular aerobic exercise. Weight loss 2 continue present medical regimen 3 home blood pressure monitoring 4 increase carvedilol (with monitoring for bradycardia)  and./or ramipril dose  if required to maintain optimum levels as discussed above.    Valvular heart disease The  echocardiogram revealed mild aortic/mitral regurgitation. The pulmonary artery systolic pressure was normal at 28 mm Hg. The left ventricle ejection fraction was 54%. The present cardiac physical examination is not suggestive of hemodynamically significant valvular pathology.  I have  recommended the following a. No further cardiac testing for this problem at this time b. Echocardiogram with next office evaluation.   Prediabetes. A 10/23 hemoglobin AC 1C level was 5.8 consistent with a diagnosis of prediabetes. I have recommended the following Low-salt low-fat low-cholesterol low carbohydrate diet.  Regular aerobic exercise and weight loss Nutrition/dietary consultation Overweight Being overweight exacerbates Edmund's cardiovascular issues.  Today Mr. Cheung is 6 feet 2 inches tall and weighs 211 pounds.  He has gained 9 pounds in the last 8 months.  Diet exercise and weight loss are advised   The diagnosis, prognosis, risks, options and alternatives were explained at length to the patient. All questions were answered. Issues discussed included hypertension antihypertensive medical therapy cardiomyopathy/left ventricular systolic dysfunction noninvasive cardiac testing diet and exercise.  Counseling and/or  coordination of care Time was a significant factor for this patient encounter. Total time spent with patient was 30 minutes. Greater than 50% of the time was devoted to counseling and/or coordination of care

## 2024-08-17 NOTE — HISTORY OF PRESENT ILLNESS
[FreeTextEntry1] : 73-year-old man Routine follow-up for cardiomyopathy hypertension hyperlipidemia valvular heart disease and atrial fibrillation.  Edmund states that in general he feels well.  He is physically active without restriction or limitation.  Blood pressure levels reflect his level of activity and diet.  When eating salt his blood pressure becomes elevated.  At other times it remains normal.  He experiences fleeting "twinges " of chest discomfort both right and left-sided at rest without associated diaphoresis or dyspnea.  No exertional chest pain.  No sustained palpitations.  No syncope.  Edmund anticipates requirements for cataract extraction later this year

## 2024-08-17 NOTE — DISCUSSION/SUMMARY
[FreeTextEntry1] : Chest pain The working diagnosis is noncardiac musculoskeletal chest pain.  The history is compelling for this diagnosis.  A   coronary arteriogram was normal.  The resting  electrocardiogram is normal showing no evidence of myocardial ischemia or infarction.  I have recommended the following: Reassurance No further cardiac testing for this problem at this time Anticipate ischemic reevaluation  to include CT coronary calcium score and/or exercise stress test.   Hyperlipidemia Hyperlipidemia represents a risk factor for atherosclerotic heart disease.  The target LDL level for primary prevention is about 100.  In 10/23 the serum LDL level was 121 HDL 54 triglycerides 114 and cholesterol 195.  The main clinical decision involves whether or not to institute antihyperlipidemic medical therapy.  Nonpharmacological therapy, specifically diet exercise and weight loss are emphasized as major aspects of treatment.  I have recommended the following: Low-salt low-fat low-cholesterol heart healthy diet.  Regular aerobic exercise and weight loss Target LDL level to about 100 as discussed above Nutrition dietary consultation   Atrial fibrillation Mr. Weber has known paroxysmal atrial fibrillation/supraventricular tachycardia since age 40. Atrial arrhythmias including atrial fibrillation with rapid ventricular rates have persisted despite prior ablation procedures In 2000 he underwent an electrophysiological study and attempt at ablation through Dr. Zhang/[facility]. . In  he underwent radiofrequency ablation at [facility]. The details and reports of the ablation and electrophysiological  studies have not been available. Previous trials of antiarrhythmic agents including sotalol and flecainide were either not tolerated or ineffective. In  atrial fibrillation with ventricular rate 140/minute lead to increasing doses of carvedilol.  A 10/13 Holter monitor revealed frequent atrial ectopy and nonsustained supraventricular tachycardia. A relatively low "[id number]" score is thought to indicate the risk of anticoagulation outweighs any potential benefit at this time.  I have recommended the followin. Continue the present medical regimen 2. No further cardiac testing for this problem at this time 3. Increase carvedilol dose (with monitoring for bradycardia) if increasing symptoms of paroxysmal atrial fibrillation occur 4. Event recorder or implantable cardiac loop recorder if increasing symptoms without electrocardiographic documentation.  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 Left ventricular systolic dysfunction was reflected by a left ventricular ejection fraction of 45% on  echocardiogram with left ventricle dilatation manifested by end-diastolic dimension of 6.0 cm. A  right and left heart catheterization revealed mild diffuse biventricular hypokinesis and a left ventricular ejection fraction of 50%. The left ventricle end-diastolic and pulmonary artery systolic pressures were normal. The coronary arteries were normal. There is been a favorable response to medical intervention. The  echocardiogram demonstrated a  upper limit of  normal left ventricular end-diastolic dimension of 5.7 cm and a left ventricular ejection fraction of 54%. The present cardiac physical examination is consistent with a euvolemic state [place] Heart Association class I .The dose of both carvedilol and ramipril may be increased if required.  I have recommended the followin continue present medical regimen 2. Routine laboratory studies including electrolytes, lipid levels and renal function through primary care 3 no further cardiac testing for this problem at this time. 4. Echocardiogram with next office evaluation      Hypertension Hypertension has been controlled on the present medical regimen. (See home blood pressure monitoring report)  Elevation of blood pressure on initial examination today (147/92 mmHg) is attributed to a white coat effect.. In the setting of left ventricular systolic dysfunction beta blocker and ACE-I therapy are attractive. The doses of both ramipril and carvedilol may be increased if required to maintain optimum levels. Nonpharmacological therapy, specifically diet and exercise are emphasized as major aspects of treatment.  I have recommended the followin low-salt low-fat low-cholesterol diet. Regular aerobic exercise. Weight loss 2 continue present medical regimen 3 home blood pressure monitoring 4 increase carvedilol (with monitoring for bradycardia)  and./or ramipril dose  if required to maintain optimum levels as discussed above.    Valvular heart disease The  echocardiogram revealed mild aortic/mitral regurgitation. The pulmonary artery systolic pressure was normal at 28 mm Hg. The left ventricle ejection fraction was 54%. The present cardiac physical examination is not suggestive of hemodynamically significant valvular pathology.  I have  recommended the following a. No further cardiac testing for this problem at this time b. Echocardiogram with next office evaluation.   Prediabetes. A 10/23 hemoglobin AC 1C level was 5.8 consistent with a diagnosis of prediabetes. I have recommended the following Low-salt low-fat low-cholesterol low carbohydrate diet.  Regular aerobic exercise and weight loss Nutrition/dietary consultation Overweight Being overweight exacerbates Edmund's cardiovascular issues.  Today Mr. Cheung is 6 feet 2 inches tall and weighs 211 pounds.  He has gained 9 pounds in the last 8 months.  Diet exercise and weight loss are advised   The diagnosis, prognosis, risks, options and alternatives were explained at length to the patient. All questions were answered. Issues discussed included hypertension antihypertensive medical therapy cardiomyopathy/left ventricular systolic dysfunction noninvasive cardiac testing diet and exercise.  Counseling and/or  coordination of care Time was a significant factor for this patient encounter. Total time spent with patient was 30 minutes. Greater than 50% of the time was devoted to counseling and/or coordination of care

## 2024-08-17 NOTE — DISCUSSION/SUMMARY
[FreeTextEntry1] : Chest pain The working diagnosis is noncardiac musculoskeletal chest pain.  The history is compelling for this diagnosis.  A   coronary arteriogram was normal.  The resting  electrocardiogram is normal showing no evidence of myocardial ischemia or infarction.  I have recommended the following: Reassurance No further cardiac testing for this problem at this time Anticipate ischemic reevaluation  to include CT coronary calcium score and/or exercise stress test.   Hyperlipidemia Hyperlipidemia represents a risk factor for atherosclerotic heart disease.  The target LDL level for primary prevention is about 100.  In 10/23 the serum LDL level was 121 HDL 54 triglycerides 114 and cholesterol 195.  The main clinical decision involves whether or not to institute antihyperlipidemic medical therapy.  Nonpharmacological therapy, specifically diet exercise and weight loss are emphasized as major aspects of treatment.  I have recommended the following: Low-salt low-fat low-cholesterol heart healthy diet.  Regular aerobic exercise and weight loss Target LDL level to about 100 as discussed above Nutrition dietary consultation   Atrial fibrillation Mr. Weber has known paroxysmal atrial fibrillation/supraventricular tachycardia since age 40. Atrial arrhythmias including atrial fibrillation with rapid ventricular rates have persisted despite prior ablation procedures In 2000 he underwent an electrophysiological study and attempt at ablation through Dr. Zhang/[facility]. . In  he underwent radiofrequency ablation at [facility]. The details and reports of the ablation and electrophysiological  studies have not been available. Previous trials of antiarrhythmic agents including sotalol and flecainide were either not tolerated or ineffective. In  atrial fibrillation with ventricular rate 140/minute lead to increasing doses of carvedilol.  A 10/13 Holter monitor revealed frequent atrial ectopy and nonsustained supraventricular tachycardia. A relatively low "[id number]" score is thought to indicate the risk of anticoagulation outweighs any potential benefit at this time.  I have recommended the followin. Continue the present medical regimen 2. No further cardiac testing for this problem at this time 3. Increase carvedilol dose (with monitoring for bradycardia) if increasing symptoms of paroxysmal atrial fibrillation occur 4. Event recorder or implantable cardiac loop recorder if increasing symptoms without electrocardiographic documentation.  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 Left ventricular systolic dysfunction was reflected by a left ventricular ejection fraction of 45% on  echocardiogram with left ventricle dilatation manifested by end-diastolic dimension of 6.0 cm. A  right and left heart catheterization revealed mild diffuse biventricular hypokinesis and a left ventricular ejection fraction of 50%. The left ventricle end-diastolic and pulmonary artery systolic pressures were normal. The coronary arteries were normal. There is been a favorable response to medical intervention. The  echocardiogram demonstrated a  upper limit of  normal left ventricular end-diastolic dimension of 5.7 cm and a left ventricular ejection fraction of 54%. The present cardiac physical examination is consistent with a euvolemic state [place] Heart Association class I .The dose of both carvedilol and ramipril may be increased if required.  I have recommended the followin continue present medical regimen 2. Routine laboratory studies including electrolytes, lipid levels and renal function through primary care 3 no further cardiac testing for this problem at this time. 4. Echocardiogram with next office evaluation      Hypertension Hypertension has been controlled on the present medical regimen. (See home blood pressure monitoring report)  Elevation of blood pressure on initial examination today (147/92 mmHg) is attributed to a white coat effect.. In the setting of left ventricular systolic dysfunction beta blocker and ACE-I therapy are attractive. The doses of both ramipril and carvedilol may be increased if required to maintain optimum levels. Nonpharmacological therapy, specifically diet and exercise are emphasized as major aspects of treatment.  I have recommended the followin low-salt low-fat low-cholesterol diet. Regular aerobic exercise. Weight loss 2 continue present medical regimen 3 home blood pressure monitoring 4 increase carvedilol (with monitoring for bradycardia)  and./or ramipril dose  if required to maintain optimum levels as discussed above.    Valvular heart disease The  echocardiogram revealed mild aortic/mitral regurgitation. The pulmonary artery systolic pressure was normal at 28 mm Hg. The left ventricle ejection fraction was 54%. The present cardiac physical examination is not suggestive of hemodynamically significant valvular pathology.  I have  recommended the following a. No further cardiac testing for this problem at this time b. Echocardiogram with next office evaluation.   Prediabetes. A 10/23 hemoglobin AC 1C level was 5.8 consistent with a diagnosis of prediabetes. I have recommended the following Low-salt low-fat low-cholesterol low carbohydrate diet.  Regular aerobic exercise and weight loss Nutrition/dietary consultation Overweight Being overweight exacerbates Edmund's cardiovascular issues.  Today Mr. Cheung is 6 feet 2 inches tall and weighs 211 pounds.  He has gained 9 pounds in the last 8 months.  Diet exercise and weight loss are advised   The diagnosis, prognosis, risks, options and alternatives were explained at length to the patient. All questions were answered. Issues discussed included hypertension antihypertensive medical therapy cardiomyopathy/left ventricular systolic dysfunction noninvasive cardiac testing diet and exercise.  Counseling and/or  coordination of care Time was a significant factor for this patient encounter. Total time spent with patient was 30 minutes. Greater than 50% of the time was devoted to counseling and/or coordination of care

## 2025-03-03 ENCOUNTER — APPOINTMENT (OUTPATIENT)
Dept: CARDIOLOGY | Facility: CLINIC | Age: 75
End: 2025-03-03

## 2025-06-05 ENCOUNTER — APPOINTMENT (OUTPATIENT)
Dept: SURGERY | Facility: CLINIC | Age: 75
End: 2025-06-05

## 2025-08-11 ENCOUNTER — NON-APPOINTMENT (OUTPATIENT)
Age: 75
End: 2025-08-11

## 2025-08-11 ENCOUNTER — APPOINTMENT (OUTPATIENT)
Dept: CARDIOLOGY | Facility: CLINIC | Age: 75
End: 2025-08-11
Payer: MEDICARE

## 2025-08-11 VITALS
OXYGEN SATURATION: 97 % | DIASTOLIC BLOOD PRESSURE: 93 MMHG | BODY MASS INDEX: 26.95 KG/M2 | HEIGHT: 74 IN | SYSTOLIC BLOOD PRESSURE: 140 MMHG | HEART RATE: 60 BPM | WEIGHT: 210 LBS

## 2025-08-11 DIAGNOSIS — R07.9 CHEST PAIN, UNSPECIFIED: ICD-10-CM

## 2025-08-11 DIAGNOSIS — E87.5 HYPERKALEMIA: ICD-10-CM

## 2025-08-11 DIAGNOSIS — I38 ENDOCARDITIS, VALVE UNSPECIFIED: ICD-10-CM

## 2025-08-11 DIAGNOSIS — I48.91 UNSPECIFIED ATRIAL FIBRILLATION: ICD-10-CM

## 2025-08-11 DIAGNOSIS — R10.9 UNSPECIFIED ABDOMINAL PAIN: ICD-10-CM

## 2025-08-11 DIAGNOSIS — I10 ESSENTIAL (PRIMARY) HYPERTENSION: ICD-10-CM

## 2025-08-11 DIAGNOSIS — R11.10 VOMITING, UNSPECIFIED: ICD-10-CM

## 2025-08-11 PROCEDURE — G2211 COMPLEX E/M VISIT ADD ON: CPT

## 2025-08-11 PROCEDURE — 93000 ELECTROCARDIOGRAM COMPLETE: CPT

## 2025-08-11 PROCEDURE — 36415 COLL VENOUS BLD VENIPUNCTURE: CPT

## 2025-08-11 PROCEDURE — 99215 OFFICE O/P EST HI 40 MIN: CPT

## 2025-08-12 LAB
ANION GAP SERPL CALC-SCNC: 13 MMOL/L
BUN SERPL-MCNC: 20 MG/DL
CALCIUM SERPL-MCNC: 9.9 MG/DL
CHLORIDE SERPL-SCNC: 105 MMOL/L
CHOLEST SERPL-MCNC: 197 MG/DL
CO2 SERPL-SCNC: 26 MMOL/L
CREAT SERPL-MCNC: 0.92 MG/DL
EGFRCR SERPLBLD CKD-EPI 2021: 87 ML/MIN/1.73M2
GLUCOSE SERPL-MCNC: 101 MG/DL
HCT VFR BLD CALC: 47.4 %
HDLC SERPL-MCNC: 63 MG/DL
HGB BLD-MCNC: 14.7 G/DL
LDLC SERPL-MCNC: 115 MG/DL
MCHC RBC-ENTMCNC: 30.1 PG
MCHC RBC-ENTMCNC: 31 G/DL
MCV RBC AUTO: 97.1 FL
NONHDLC SERPL-MCNC: 135 MG/DL
PLATELET # BLD AUTO: 256 K/UL
POTASSIUM SERPL-SCNC: 4.9 MMOL/L
RBC # BLD: 4.88 M/UL
RBC # FLD: 14.9 %
SODIUM SERPL-SCNC: 144 MMOL/L
TRIGL SERPL-MCNC: 113 MG/DL
WBC # FLD AUTO: 6.04 K/UL

## 2025-08-18 ENCOUNTER — RESULT REVIEW (OUTPATIENT)
Age: 75
End: 2025-08-18

## 2025-08-18 ENCOUNTER — NON-APPOINTMENT (OUTPATIENT)
Age: 75
End: 2025-08-18

## 2025-08-18 DIAGNOSIS — Z86.79 PERSONAL HISTORY OF OTHER DISEASES OF THE CIRCULATORY SYSTEM: ICD-10-CM

## 2025-09-02 ENCOUNTER — NON-APPOINTMENT (OUTPATIENT)
Age: 75
End: 2025-09-02

## 2025-09-03 ENCOUNTER — APPOINTMENT (OUTPATIENT)
Dept: CARDIOLOGY | Facility: CLINIC | Age: 75
End: 2025-09-03
Payer: MEDICARE

## 2025-09-03 DIAGNOSIS — Z86.79 PERSONAL HISTORY OF OTHER DISEASES OF THE CIRCULATORY SYSTEM: ICD-10-CM

## 2025-09-03 DIAGNOSIS — I42.9 CARDIOMYOPATHY, UNSPECIFIED: ICD-10-CM

## 2025-09-03 DIAGNOSIS — R07.9 CHEST PAIN, UNSPECIFIED: ICD-10-CM

## 2025-09-03 PROCEDURE — 93306 TTE W/DOPPLER COMPLETE: CPT

## 2025-09-12 ENCOUNTER — APPOINTMENT (OUTPATIENT)
Dept: CARDIOLOGY | Facility: CLINIC | Age: 75
End: 2025-09-12